# Patient Record
Sex: FEMALE | Race: WHITE | NOT HISPANIC OR LATINO
[De-identification: names, ages, dates, MRNs, and addresses within clinical notes are randomized per-mention and may not be internally consistent; named-entity substitution may affect disease eponyms.]

---

## 2018-07-28 ENCOUNTER — HOSPITAL (OUTPATIENT)
Dept: OTHER | Age: 43
End: 2018-07-28

## 2018-07-28 LAB
ANA INTERP: ABNORMAL
ANA PATTERN: ABNORMAL
ANA TITER: 80
ANTI-DSDNA: <10
ASO TITER: <100 UNITS/ML
CARDIO AB IGA: <20 APL
CARDIO AB IGG: <20 GPL
CARDIO AB IGM: <20 MPL
CERULOPLASMIN: 39.7 MG/DL (ref 22–58)
COPPER LVL: 126 MCG/DL (ref 80–155)
HEMOLYSIS 1+: NEGATIVE
JO-1 AB: <0.2 AI (ref 0–0.9)
NRNP/SM IGG: <0.2 AI (ref 0–0.9)
REF LAB RESULT: NORMAL
REF TEST NAME: NORMAL
RNP IGG AB: <0.2 AI (ref 0–0.9)
SCL-70 IGG: <0.2 AI (ref 0–0.9)
SED RATE: 8 MM/HR (ref 0–20)
SM(SMITH) IGG: <0.2 AI (ref 0–0.9)
SMOOTH MUS AB: NEGATIVE
SS-A IGG: <0.2 AI (ref 0–0.9)
SS-B IGG: <0.2 AI (ref 0–0.9)
T4 SERPL-MCNC: 5.7 UG/DL (ref 4–12)
TPO_ABS: 0.9 INT.UNITS/ML
TSH SERPL-ACNC: 1.78 MIU/ML (ref 0.4–5)
VIT B6: 54.3
VIT E (ALPHA): 16.8 MG/L (ref 6–23)
VIT E (GAMMA): 1.5 MG/L (ref 0.3–3.2)
VITAMIN B12 LVL: 852 PG/ML (ref 213–816)

## 2019-01-18 PROBLEM — F44.4 FUNCTIONAL NEUROLOGICAL SYMPTOM DISORDER WITH ABNORMAL MOVEMENT: Status: ACTIVE | Noted: 2019-01-18

## 2019-01-18 PROBLEM — F41.9 ANXIETY: Status: ACTIVE | Noted: 2019-01-18

## 2019-01-18 PROBLEM — G43.409 HEMIPLEGIC MIGRAINE WITHOUT STATUS MIGRAINOSUS, NOT INTRACTABLE: Status: ACTIVE | Noted: 2019-01-18

## 2019-01-18 NOTE — PROGRESS NOTES
[facility] [place]  PROGRESS NOTE  Chief Complaint:   Patient presents with:  Establish Care      HPI:   This is a 37year old female coming in for estalishment of care- Pt new to area and new insurance- HMO  Pt presents with her wife    Pt move status: Former Smoker      Smokeless tobacco: Never Used    Substance and Sexual Activity      Alcohol use: No        Frequency: Never      Drug use: Yes        Types: Cannabis        Comment: 1 nightly- for tremors    Family History:  History reviewed.  No Patient Position: Sitting, Cuff Size: large)   Pulse 96   Temp 97.5 °F (36.4 °C) (Tympanic)   Resp 12   Ht 64.75\"   Wt 198 lb 9.6 oz   SpO2 98%   BMI 33.30 kg/m²  Estimated body mass index is 33.3 kg/m² as calculated from the following:    Height as of th on file for this visit.     Meds & Refills for this Visit:  Requested Prescriptions      No prescriptions requested or ordered in this encounter       Health Maintenance:        Brooklynn Gonzalez MD  1/18/2019  4:01 PM    Patient/Caregiver Education: Sonny Hurley

## 2019-01-19 NOTE — PATIENT INSTRUCTIONS
rec neurology eval - referral to be made  Consider foot and ankle eval as contracture may cause long term issues    Pt became unstable with \" episode \" during visit. Sent by ambulance to [facility] for eval.  Pt more alert by time EMT arrived.   Report called to

## 2019-01-21 NOTE — TELEPHONE ENCOUNTER
I can give note  For pt to be off work, I expect they will have forms that need to be completed. OK for one refill meds  Pt to get established with neurology asap.

## 2019-01-21 NOTE — TELEPHONE ENCOUNTER
Re:  was seen on 1/18 @ 4pm - Was sent to ER . Needs refill of meds & note for work. Wants to update  On her condition.

## 2019-01-21 NOTE — TELEPHONE ENCOUNTER
Pt needs a note for work to go on short term disability. That is why pt was here on Friday before being sent to ER. When pt was in the ER, having her 3rd seizure the ER Dr states that pt was faking her seizures.     Dr informed pt if she needs to both

## 2019-01-22 ENCOUNTER — TELEPHONE (OUTPATIENT)
Dept: FAMILY MEDICINE CLINIC | Facility: CLINIC | Age: 44
End: 2019-01-22

## 2019-01-22 NOTE — TELEPHONE ENCOUNTER
Please send referral to Dr Leta Ridley at [facility]. Call her back if any questions.

## 2019-01-22 NOTE — TELEPHONE ENCOUNTER
Pt asking about neurology referral.  Pt informed that CR did provide referral to Dr. Madai Barry- contact info. given to pt.   Yoly Hendrickson stated that was fine, pt states she thought see needed to call in with name of neurologist.  Also, pt asked if she complet

## 2019-01-23 ENCOUNTER — OFFICE VISIT (OUTPATIENT)
Dept: NEUROLOGY | Facility: CLINIC | Age: 44
End: 2019-01-23

## 2019-01-23 VITALS
HEIGHT: 64.75 IN | HEART RATE: 78 BPM | DIASTOLIC BLOOD PRESSURE: 70 MMHG | RESPIRATION RATE: 16 BRPM | BODY MASS INDEX: 34.24 KG/M2 | SYSTOLIC BLOOD PRESSURE: 130 MMHG | WEIGHT: 203 LBS

## 2019-01-23 DIAGNOSIS — G43.409 HEMIPLEGIC MIGRAINE WITHOUT STATUS MIGRAINOSUS, NOT INTRACTABLE: ICD-10-CM

## 2019-01-23 DIAGNOSIS — G24.9 DYSTONIA: ICD-10-CM

## 2019-01-23 DIAGNOSIS — F44.4 FUNCTIONAL NEUROLOGICAL SYMPTOM DISORDER WITH ABNORMAL MOVEMENT: ICD-10-CM

## 2019-01-23 DIAGNOSIS — R29.90 EPISODE OF TRANSIENT NEUROLOGIC SYMPTOMS: Primary | ICD-10-CM

## 2019-01-23 PROCEDURE — 99203 OFFICE O/P NEW LOW 30 MIN: CPT | Performed by: PHYSICIAN ASSISTANT

## 2019-01-23 RX ORDER — BROMOCRIPTINE MESYLATE 2.5 MG/1
2.5 TABLET ORAL DAILY
Qty: 60 TABLET | Refills: 2 | Status: SHIPPED | OUTPATIENT
Start: 2019-01-23 | End: 2019-02-27 | Stop reason: DRUGHIGH

## 2019-01-23 NOTE — PATIENT INSTRUCTIONS
Refill policies:    • Allow 2-3 business days for refills; controlled substances may take longer.   • Contact your pharmacy at least 5 days prior to running out of medication and have them send an electronic request or submit request through the “request re entire amount billed. Precertification and Prior Authorizations: If your physician has recommended that you have a procedure or additional testing performed.   [facility]) will contact your insurance carrier to obtain pre-certi

## 2019-01-23 NOTE — PROGRESS NOTES
[facility] with [address]  4/18/1975  Primary Care Provider:  Alex Santa MD    1/23/2019  Accompanied visit:  () No (X) yes, by: Wife  Dr. Alan Rock  37year old female pa neural foraminal compromise,most noted on the left at C5-C6. Review of Systems:  Review of Systems:  Denies systemic symptoms     No SOB. No GI or  symptoms. Relevant Neuro as noted above.     Past Medical History:   Diagnosis Date   • Endometriosis exam  Patient waling on lateral aspect of the right foot. Unable to tandem gait.  Romberg positive  Unable to walk back words  Right hand tremor on exam that is distractible  Using cane  Patient with facial twitching and neck movements that seemed to wors

## 2019-01-25 ENCOUNTER — TELEPHONE (OUTPATIENT)
Dept: NEUROLOGY | Facility: CLINIC | Age: 44
End: 2019-01-25

## 2019-01-25 NOTE — TELEPHONE ENCOUNTER
Received MRI Brain w/o contrast  [facility]  DOS:  05/20/17    Reviewed by Jeanine Anthony    Sent to Scanning

## 2019-01-25 NOTE — TELEPHONE ENCOUNTER
Received MRI Cervical Spine without contrast  DOS:  06/14/18    KYLE Tavera in the [facility]  Dr. Hess Bone    Reviewed by Yamini Gonzalez to scanning

## 2019-01-28 ENCOUNTER — TELEPHONE (OUTPATIENT)
Dept: NEUROLOGY | Facility: CLINIC | Age: 44
End: 2019-01-28

## 2019-01-29 NOTE — PROGRESS NOTES
[facility] [place]  PROGRESS NOTE  Chief Complaint:   Patient presents with:  Hospital F/U      HPI:   This is a 37year old female coming in for medical follow-up. Patient seen initially on the 18th for initial visit.   During that visit she ha nightly- for tremors    Other Topics      Concerns:        Caffeine Concern: Yes          2 sodas a day        Exercise: No    Family History:  Family History   Problem Relation Age of Onset   • Glaucoma Father    • Hypertension Mother    • Glaucoma Mother Position: Sitting, Cuff Size: large)   Pulse 88   Temp 97.6 °F (36.4 °C) (Temporal)   Resp 16   Ht 64.75\"   Wt 201 lb 3.2 oz   SpO2 98%   BMI 33.74 kg/m²  Estimated body mass index is 33.74 kg/m² as calculated from the following:    Height as of this enco symptoms. Patient is to call with any side effects or complications from the treatments as a result of today.

## 2019-01-29 NOTE — PATIENT INSTRUCTIONS
Continue plan for EEG and neurology f/u    Continue stool softner 100 mg a day, laxitive as needed  Could  Increase to colace 100mg bid    Encourage hydration    Patient to continue same meds pending neurology workup

## 2019-01-31 ENCOUNTER — TELEPHONE (OUTPATIENT)
Dept: NEUROLOGY | Facility: CLINIC | Age: 44
End: 2019-01-31

## 2019-01-31 NOTE — TELEPHONE ENCOUNTER
Almita called with status of fax regarding OV medical records. At this time no fax received. Requested Almita to fax request again.

## 2019-02-01 ENCOUNTER — NURSE ONLY (OUTPATIENT)
Dept: NEUROLOGY | Facility: CLINIC | Age: 44
End: 2019-02-01

## 2019-02-01 DIAGNOSIS — G24.9 DYSTONIA: ICD-10-CM

## 2019-02-01 DIAGNOSIS — G43.409 HEMIPLEGIC MIGRAINE WITHOUT STATUS MIGRAINOSUS, NOT INTRACTABLE: Primary | ICD-10-CM

## 2019-02-01 DIAGNOSIS — R29.90 EPISODE OF TRANSIENT NEUROLOGIC SYMPTOMS: ICD-10-CM

## 2019-02-01 DIAGNOSIS — F44.4 FUNCTIONAL NEUROLOGICAL SYMPTOM DISORDER WITH ABNORMAL MOVEMENT: ICD-10-CM

## 2019-02-01 PROCEDURE — 95819 EEG AWAKE AND ASLEEP: CPT | Performed by: OTHER

## 2019-02-02 ENCOUNTER — TELEPHONE (OUTPATIENT)
Dept: FAMILY MEDICINE CLINIC | Facility: CLINIC | Age: 44
End: 2019-02-02

## 2019-02-02 NOTE — TELEPHONE ENCOUNTER
Paperwork for [employer]- Medical Request Form completed per CR. Form faxed to [employer] #[phone] and copy left at  for pt . Copy also placed into scanning.

## 2019-02-04 NOTE — PROCEDURES
[facility] in [place]  in affiliation with [facility]  [address]  [place], [address] [place] [address]  [phone]  Fax [phone]    Name: Barbie Brochure  4/18/1975  Date of S of stage II sleep seen, characterized by normal sleep spindles and occasional vertex waves and K complexes. Activation procedures:  Hyperventilation not performed. Photic driving produced driving. No activation of any paroxysmal discharges seen.

## 2019-02-05 ENCOUNTER — TELEPHONE (OUTPATIENT)
Dept: NEUROLOGY | Facility: CLINIC | Age: 44
End: 2019-02-05

## 2019-02-05 NOTE — TELEPHONE ENCOUNTER
Request received from [facility] for medical records for patient. Requested LOV notes. [facility] confirmation.

## 2019-02-07 ENCOUNTER — TELEPHONE (OUTPATIENT)
Dept: NEUROLOGY | Facility: CLINIC | Age: 44
End: 2019-02-07

## 2019-02-27 ENCOUNTER — OFFICE VISIT (OUTPATIENT)
Dept: NEUROLOGY | Facility: CLINIC | Age: 44
End: 2019-02-27

## 2019-02-27 VITALS
SYSTOLIC BLOOD PRESSURE: 123 MMHG | DIASTOLIC BLOOD PRESSURE: 82 MMHG | HEIGHT: 64.75 IN | WEIGHT: 201 LBS | RESPIRATION RATE: 16 BRPM | BODY MASS INDEX: 33.9 KG/M2 | HEART RATE: 89 BPM

## 2019-02-27 DIAGNOSIS — F41.9 ANXIETY: ICD-10-CM

## 2019-02-27 DIAGNOSIS — G24.9 DYSTONIA: Primary | ICD-10-CM

## 2019-02-27 DIAGNOSIS — F44.4 FUNCTIONAL NEUROLOGICAL SYMPTOM DISORDER WITH ABNORMAL MOVEMENT: ICD-10-CM

## 2019-02-27 DIAGNOSIS — R29.90 EPISODE OF TRANSIENT NEUROLOGIC SYMPTOMS: ICD-10-CM

## 2019-02-27 DIAGNOSIS — G43.409 HEMIPLEGIC MIGRAINE WITHOUT STATUS MIGRAINOSUS, NOT INTRACTABLE: ICD-10-CM

## 2019-02-27 PROCEDURE — 99214 OFFICE O/P EST MOD 30 MIN: CPT | Performed by: PHYSICIAN ASSISTANT

## 2019-02-27 RX ORDER — BUSPIRONE HYDROCHLORIDE 5 MG/1
TABLET ORAL
Qty: 60 TABLET | Refills: 2 | Status: SHIPPED | OUTPATIENT
Start: 2019-02-27 | End: 2019-04-29

## 2019-02-27 RX ORDER — BROMOCRIPTINE MESYLATE 5 MG/1
CAPSULE ORAL
Qty: 60 CAPSULE | Refills: 2 | Status: SHIPPED | OUTPATIENT
Start: 2019-02-27 | End: 2019-03-27 | Stop reason: DRUGHIGH

## 2019-02-27 NOTE — PROGRESS NOTES
[facility] with Liang Hernandez  4/18/1975  Primary Care Provider:  Karina Bagley MD    2/27/2019  Accompanied visit:  () No (X) yes, by: wife    37year old female patient b Oral Tab, Take 1 tablet (10 mg total) by mouth nightly., Disp: 90 tablet, Rfl: 0  •  ALPRAZolam 0.25 MG Oral Tab, Take 0.25 mg by mouth daily as needed. , Disp: , Rfl: 1  •  HYDROcodone-acetaminophen 5-325 MG Oral Tab, Take 1 tablet by mouth 3 (three) times this time      (X) Discussed potential side effects of any treatment relevant to above.     Follow up, in approximately:  (X) 6-8 weeks    () 3-4 months     () 6-8 months   () yearly   () As needed but knows to call if there are problems  () Followup for sp

## 2019-03-06 ENCOUNTER — TELEPHONE (OUTPATIENT)
Dept: FAMILY MEDICINE CLINIC | Facility: CLINIC | Age: 44
End: 2019-03-06

## 2019-03-06 ENCOUNTER — TELEPHONE (OUTPATIENT)
Dept: NEUROLOGY | Facility: CLINIC | Age: 44
End: 2019-03-06

## 2019-03-06 NOTE — TELEPHONE ENCOUNTER
Pt requesting a note that she is released to go back to work with no restrictions next Monday, March 11. Pt will pick note up once ready. Letter pended and printed for Meredith Roldan's review and signature. Letter reviewed and signed.   Placed in doreen

## 2019-03-07 ENCOUNTER — TELEPHONE (OUTPATIENT)
Dept: NEUROLOGY | Facility: CLINIC | Age: 44
End: 2019-03-07

## 2019-03-07 NOTE — TELEPHONE ENCOUNTER
Paperwork received from [facility] for disability medical request.  Called pt, Richelle Gotti states that paperwork should have been sent to neurology-  Dr. Shary Angelucci office, pt has been also seeing Melissa BANUELOS.   Paperwork faxed to #[phone] per pt

## 2019-03-07 NOTE — TELEPHONE ENCOUNTER
Pt dropped off another form to be filled out with signature to return to work. Needs to be completed and faxed by Monday, 03/11/18    Placed in nurses bin.

## 2019-03-08 NOTE — TELEPHONE ENCOUNTER
HR mgr calling to verify RTW note they received is valid. Confirmed content of 3/6/19 letter and verified it is part of the patient's chart.

## 2019-03-12 NOTE — TELEPHONE ENCOUNTER
Paperwork initiated, endorsed to [address] for review, completion and signature. Received completed, signed paperwork. Faxed as requested, confirmation rec'd. Copy to scan.

## 2019-03-14 ENCOUNTER — TELEPHONE (OUTPATIENT)
Dept: NEUROLOGY | Facility: CLINIC | Age: 44
End: 2019-03-14

## 2019-03-27 ENCOUNTER — OFFICE VISIT (OUTPATIENT)
Dept: NEUROLOGY | Facility: CLINIC | Age: 44
End: 2019-03-27

## 2019-03-27 VITALS
DIASTOLIC BLOOD PRESSURE: 82 MMHG | BODY MASS INDEX: 33 KG/M2 | RESPIRATION RATE: 16 BRPM | SYSTOLIC BLOOD PRESSURE: 134 MMHG | WEIGHT: 198 LBS | HEART RATE: 70 BPM

## 2019-03-27 DIAGNOSIS — G43.409 HEMIPLEGIC MIGRAINE WITHOUT STATUS MIGRAINOSUS, NOT INTRACTABLE: ICD-10-CM

## 2019-03-27 DIAGNOSIS — F41.9 ANXIETY: ICD-10-CM

## 2019-03-27 DIAGNOSIS — M54.16 LUMBAR RADICULOPATHY: Primary | ICD-10-CM

## 2019-03-27 DIAGNOSIS — F44.4 FUNCTIONAL NEUROLOGICAL SYMPTOM DISORDER WITH ABNORMAL MOVEMENT: ICD-10-CM

## 2019-03-27 DIAGNOSIS — G24.9 DYSTONIA: ICD-10-CM

## 2019-03-27 PROCEDURE — 99214 OFFICE O/P EST MOD 30 MIN: CPT | Performed by: PHYSICIAN ASSISTANT

## 2019-03-27 RX ORDER — BROMOCRIPTINE MESYLATE 2.5 MG/1
TABLET ORAL
Qty: 60 TABLET | Refills: 3 | Status: SHIPPED | OUTPATIENT
Start: 2019-03-27 | End: 2019-07-10

## 2019-03-27 NOTE — PROGRESS NOTES
Patient here to follow up regarding leg pain, tremors, headaches, memory loss. States that she has been more stiff, increased right foot heaviness. Memory is better since last visit overall.

## 2019-03-27 NOTE — PROGRESS NOTES
[facility] with Liang Hernandez  4/18/1975  Primary Care Provider:  Linda Taylor MD    3/27/2019  Accompanied visit:  () No (X) yes, by: signifcantother    53year old femal 198 lb   BMI 33.20 kg/m²   Looks stated age  Pink conjunctiva anicteric sclerae, moist mucosa  No LAD, neck supple  Lungs clear breath sounds  Heart S1S2, no abnormal sounds  Pink nailbeds no Cyanosis, pulses palpated    NEURO  NAD, A&Ox3  EOMI  CN II-XII documented above          Patient was seen and examined by Krystal Lindsay PA-C and Dr. Cosmo Fritz. Plan was discussed with patient and she expressed full understanding.

## 2019-04-29 DIAGNOSIS — G43.409 HEMIPLEGIC MIGRAINE WITHOUT STATUS MIGRAINOSUS, NOT INTRACTABLE: Primary | ICD-10-CM

## 2019-04-29 RX ORDER — BUSPIRONE HYDROCHLORIDE 5 MG/1
TABLET ORAL
Qty: 60 TABLET | Refills: 2 | Status: SHIPPED | OUTPATIENT
Start: 2019-04-29 | End: 2019-05-29

## 2019-04-29 NOTE — TELEPHONE ENCOUNTER
Medication: Buspirone HCL 5mg    Date of last refill: 02/27/2019 (#60/2)  [place]    Last office visit: 3/27/2019  Due back to clinic per last office note:  3-4 months  Date next office visit scheduled:  No future ap

## 2019-05-29 DIAGNOSIS — G43.409 HEMIPLEGIC MIGRAINE WITHOUT STATUS MIGRAINOSUS, NOT INTRACTABLE: ICD-10-CM

## 2019-05-29 RX ORDER — BUSPIRONE HYDROCHLORIDE 5 MG/1
TABLET ORAL
Qty: 60 TABLET | Refills: 3 | Status: SHIPPED | OUTPATIENT
Start: 2019-05-29 | End: 2019-07-10

## 2019-05-29 RX ORDER — BROMOCRIPTINE MESYLATE 5 MG/1
CAPSULE ORAL
Qty: 60 CAPSULE | Refills: 3 | Status: SHIPPED | OUTPATIENT
Start: 2019-05-29 | End: 2019-07-08

## 2019-05-29 NOTE — TELEPHONE ENCOUNTER
Medication: Buspirone 5 mg tab PO BID    Date of last refill: 04/29/2019 (#60/2)  [place]    Last office visit: 3/27/2019  Due back to clinic per last office note: 3-4 months  Date next office visit scheduled:  No fu

## 2019-05-29 NOTE — TELEPHONE ENCOUNTER
Medication: Bromocriptine  2.5mg     Date of last refill: 03/27/19 (#60/3)  [place]     Last office visit: 3/27/2019  Due back to clinic per last office note:  3-4 months  Date next office visit scheduled:  No future

## 2019-07-03 ENCOUNTER — TELEPHONE (OUTPATIENT)
Dept: FAMILY MEDICINE CLINIC | Facility: CLINIC | Age: 44
End: 2019-07-03

## 2019-07-03 RX ORDER — AMITRIPTYLINE HYDROCHLORIDE 10 MG/1
10 TABLET, FILM COATED ORAL NIGHTLY
Qty: 90 TABLET | Refills: 0 | Status: CANCELLED | OUTPATIENT
Start: 2019-07-03

## 2019-07-03 NOTE — TELEPHONE ENCOUNTER
Future appt:    Last Appointment:  1/29/2019    Pt asking for med refill  Amitriptyline refilled on 1/21/19 for #90 -  Pt states she did not have insurance, but now is covered as of July 1st.  Pt is out of medication, looking for refill            No resul

## 2019-07-03 NOTE — TELEPHONE ENCOUNTER
Patient advised to make appointment to re-establish care. Last amytriptyline rx filled in January.  Does not appear pt taking on regular basis

## 2019-07-03 NOTE — TELEPHONE ENCOUNTER
Requesting refill for Buspirone and Amitriptyline sent to [facility]

## 2019-07-05 DIAGNOSIS — M54.16 LUMBAR RADICULOPATHY: Primary | ICD-10-CM

## 2019-07-05 RX ORDER — BROMOCRIPTINE MESYLATE 5 MG/1
CAPSULE ORAL
Qty: 60 CAPSULE | Refills: 3 | OUTPATIENT
Start: 2019-07-05

## 2019-07-05 NOTE — TELEPHONE ENCOUNTER
Medication: Bromocriptine 5 mg     Date of last refill: 05/29/19 with 3 addt refills to [facility]  [place]     Last office visit: 3/27/2019  Due back to clinic per last office note: 3-4 months  Date next office visit

## 2019-07-08 RX ORDER — BROMOCRIPTINE MESYLATE 5 MG/1
CAPSULE ORAL
Qty: 60 CAPSULE | Refills: 3 | Status: SHIPPED | OUTPATIENT
Start: 2019-07-08 | End: 2019-11-03

## 2019-10-28 ENCOUNTER — TELEPHONE (OUTPATIENT)
Dept: NEUROLOGY | Facility: CLINIC | Age: 44
End: 2019-10-28

## 2019-10-28 NOTE — TELEPHONE ENCOUNTER
Pt newly diagnosed with tremors and feels she is getting worse. Tremors are now in her neck. Declined appt with Meredith, took first available with Dr. Janet Long - 11/27. Can something be done before appt?

## 2019-11-03 DIAGNOSIS — M54.16 LUMBAR RADICULOPATHY: ICD-10-CM

## 2019-11-04 RX ORDER — BROMOCRIPTINE MESYLATE 5 MG/1
CAPSULE ORAL
Qty: 60 CAPSULE | Refills: 1 | Status: SHIPPED | OUTPATIENT
Start: 2019-11-04 | End: 2019-11-13

## 2019-11-13 ENCOUNTER — TELEPHONE (OUTPATIENT)
Dept: NEUROLOGY | Facility: CLINIC | Age: 44
End: 2019-11-13

## 2019-11-13 DIAGNOSIS — M54.16 LUMBAR RADICULOPATHY: ICD-10-CM

## 2019-11-13 RX ORDER — BROMOCRIPTINE MESYLATE 5 MG/1
CAPSULE ORAL
Qty: 60 CAPSULE | Refills: 1 | COMMUNITY
Start: 2019-11-13 | End: 2020-01-14

## 2019-11-13 NOTE — TELEPHONE ENCOUNTER
RN confirmed with partner Aurora Ponce for [facility] Figueroa that she will need more of the Bromocriptine. RN will call in new script. RN encouraged partner to try to relax and the script was going to call in the script.       Aurora Ponce verbalized understanding an

## 2019-11-13 NOTE — TELEPHONE ENCOUNTER
Current Outpatient Medications:   •  Bromocriptine Mesylate 5 MG Oral Cap, TAKE 1 CAPSULE BY MOUTH TWICE A DAY, Disp: 60 capsule, Rfl: 1  •  busPIRone HCl 5 MG Oral Tab, TAKE 1 TABLET BY MOUTH TWICE A DAY, Disp: 180 tablet, Rfl: 0  •  hydrocodone-homatro

## 2019-11-13 NOTE — TELEPHONE ENCOUNTER
RN call In script Bromocriptine 5mg TID. Spoke to May the pharmacist and phoned in the order. Pharmacist confirmed the order.

## 2019-11-13 NOTE — TELEPHONE ENCOUNTER
S: Spouse- Manuel Bond called for a pt-Shyanne in a panic as the pt was texting her the following symptoms.      B: Dx: Dystonia, Tremors, Anxiety      A: Pt Can barely speak this morning, falling often, and her hands are shaking terribly that she can not ti

## 2019-11-27 ENCOUNTER — TELEPHONE (OUTPATIENT)
Dept: NEUROLOGY | Facility: CLINIC | Age: 44
End: 2019-11-27

## 2019-11-27 ENCOUNTER — OFFICE VISIT (OUTPATIENT)
Dept: NEUROLOGY | Facility: CLINIC | Age: 44
End: 2019-11-27
Payer: COMMERCIAL

## 2019-11-27 VITALS
RESPIRATION RATE: 16 BRPM | SYSTOLIC BLOOD PRESSURE: 132 MMHG | HEART RATE: 74 BPM | BODY MASS INDEX: 31.2 KG/M2 | WEIGHT: 185 LBS | DIASTOLIC BLOOD PRESSURE: 72 MMHG | HEIGHT: 64.5 IN

## 2019-11-27 DIAGNOSIS — F44.4 FUNCTIONAL NEUROLOGICAL SYMPTOM DISORDER WITH ABNORMAL MOVEMENT: ICD-10-CM

## 2019-11-27 DIAGNOSIS — G24.9 DYSTONIA: Primary | ICD-10-CM

## 2019-11-27 DIAGNOSIS — M62.838 MUSCLE SPASM: ICD-10-CM

## 2019-11-27 PROCEDURE — 99214 OFFICE O/P EST MOD 30 MIN: CPT | Performed by: OTHER

## 2019-11-27 RX ORDER — BACLOFEN 10 MG/1
10 TABLET ORAL NIGHTLY
Qty: 30 TABLET | Refills: 2 | Status: SHIPPED | OUTPATIENT
Start: 2019-11-27 | End: 2019-12-13

## 2019-11-27 NOTE — TELEPHONE ENCOUNTER
Pt called to speak with  in regards to her appt with Dr. Nima Rooney on 11/27/19. Advised pt manager will return on 11/29/19 and will return call on that day. Pt agreed and will await call on 11/29/19.

## 2019-11-27 NOTE — PROGRESS NOTES
[facility] Progress Note    HPI  Kiera Stark is a 53year old female with PMHx significant for tremors, dystonia, anxiety, who is normally followed by my neurology associate Dr. Fely Marshall.      Patient presents in follow up; she name: Not on file      Number of children: Not on file      Years of education: Not on file      Highest education level: Not on file    Occupational History      Occupation:  and Systems    Tobacco Use      Smoking statu bilaterally  Extremities: no edema, peripheral pulses intact    Neck: flexed forward with tenderness to cervical paraspinal muscles bilaterally but able to rotate side to side though with pain    Neuro:  Mental status:  Orientation: Alert and oriented to p neurologist, Dr. 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

## 2019-11-29 NOTE — TELEPHONE ENCOUNTER
Pt was not happy with her visit with Dr. Gaurang Tee on 11/27/19. Per pt, Dr. Gaurang Tee did not shake her hand or introduce herself, generally unhappy with OV. I apologized for the bad experience and schedule OV for Tuesday 12/3/19 with Dr. Kavita Galvan.

## 2019-12-13 ENCOUNTER — OFFICE VISIT (OUTPATIENT)
Dept: NEUROLOGY | Facility: CLINIC | Age: 44
End: 2019-12-13
Payer: COMMERCIAL

## 2019-12-13 VITALS
WEIGHT: 185 LBS | DIASTOLIC BLOOD PRESSURE: 78 MMHG | BODY MASS INDEX: 31.2 KG/M2 | RESPIRATION RATE: 16 BRPM | HEART RATE: 74 BPM | SYSTOLIC BLOOD PRESSURE: 122 MMHG | HEIGHT: 64.5 IN

## 2019-12-13 DIAGNOSIS — G24.9 DYSTONIA: Primary | ICD-10-CM

## 2019-12-13 DIAGNOSIS — M62.838 MUSCLE SPASM: ICD-10-CM

## 2019-12-13 DIAGNOSIS — R29.90 EPISODE OF TRANSIENT NEUROLOGIC SYMPTOMS: ICD-10-CM

## 2019-12-13 DIAGNOSIS — G43.409 HEMIPLEGIC MIGRAINE WITHOUT STATUS MIGRAINOSUS, NOT INTRACTABLE: ICD-10-CM

## 2019-12-13 PROCEDURE — 99214 OFFICE O/P EST MOD 30 MIN: CPT | Performed by: OTHER

## 2019-12-13 RX ORDER — BACLOFEN 10 MG/1
10 TABLET ORAL NIGHTLY
Qty: 30 TABLET | Refills: 2 | Status: SHIPPED | OUTPATIENT
Start: 2019-12-13 | End: 2020-05-27

## 2019-12-13 RX ORDER — SUMATRIPTAN 100 MG/1
TABLET, FILM COATED ORAL
Qty: 9 TABLET | Refills: 3 | Status: SHIPPED | OUTPATIENT
Start: 2019-12-13

## 2019-12-13 NOTE — PROGRESS NOTES
[facility] with Liang Hernandez  4/18/1975  Primary Care Provider:  Yan Johnson MD    12/13/2019  Accompanied visit: x    ( ) No.        53year old yo patient being seen for: Cyanosis, pulses palpated    NEURO  Neuro: Alert oriented with normal CN 2-12. No motor and sensory deficits. DTRs were symmetric and no upper motor neuron signs. Coordination was normal.      RELEVANT LABS and other DATA reviewed.        Problem/s Identifi including consultations  (  ) [facility] meetings - patient not present --non F2F  Non Face to Face CPT code 77810/94087 applies as documented above    PROCEDURE DONE     (   ) see notes      After visit, patient was escorted out and handed-off discharge process

## 2020-01-02 PROBLEM — G43.909 MIGRAINES: Status: ACTIVE | Noted: 2020-01-02

## 2020-01-14 DIAGNOSIS — M54.16 LUMBAR RADICULOPATHY: ICD-10-CM

## 2020-01-14 RX ORDER — BROMOCRIPTINE MESYLATE 5 MG/1
CAPSULE ORAL
Qty: 60 CAPSULE | Refills: 5 | Status: SHIPPED | OUTPATIENT
Start: 2020-01-14 | End: 2020-06-15

## 2020-01-14 NOTE — TELEPHONE ENCOUNTER
Medication: Bromocriptine 5 mg     Date of last refill:11/13/2019 with 1 addt refill        Last office visit: 11/27/2019  Due back to clinic per last office note: 3-4 months  Date next office visit scheduled:  03/12/2020     Last OV note recommendation: P

## 2020-02-03 NOTE — TELEPHONE ENCOUNTER
----- Message from Ernesto Hernández sent at 2/3/2020  8:51 AM CST -----  Contact: Loli Fragoso  Wife is calling she says she needs nurse matt to give her a call back please in regards to her .   Wife# [phone]   Send to [place]

## 2020-03-12 ENCOUNTER — TELEPHONE (OUTPATIENT)
Dept: NEUROLOGY | Facility: CLINIC | Age: 45
End: 2020-03-12

## 2020-03-12 ENCOUNTER — OFFICE VISIT (OUTPATIENT)
Dept: NEUROLOGY | Facility: CLINIC | Age: 45
End: 2020-03-12
Payer: COMMERCIAL

## 2020-03-12 VITALS
SYSTOLIC BLOOD PRESSURE: 130 MMHG | HEART RATE: 74 BPM | BODY MASS INDEX: 31.2 KG/M2 | WEIGHT: 185 LBS | RESPIRATION RATE: 16 BRPM | HEIGHT: 64.5 IN | DIASTOLIC BLOOD PRESSURE: 70 MMHG

## 2020-03-12 DIAGNOSIS — G43.401 HEMIPLEGIC MIGRAINE, NOT INTRACTABLE, WITH STATUS MIGRAINOSUS: Primary | ICD-10-CM

## 2020-03-12 DIAGNOSIS — G24.9 DYSTONIA: Primary | ICD-10-CM

## 2020-03-12 DIAGNOSIS — G43.409 HEMIPLEGIC MIGRAINE WITHOUT STATUS MIGRAINOSUS, NOT INTRACTABLE: ICD-10-CM

## 2020-03-12 PROCEDURE — 99214 OFFICE O/P EST MOD 30 MIN: CPT | Performed by: OTHER

## 2020-03-12 RX ORDER — AMITRIPTYLINE HYDROCHLORIDE 10 MG/1
10 TABLET, FILM COATED ORAL NIGHTLY
Qty: 30 TABLET | Refills: 1 | Status: SHIPPED | OUTPATIENT
Start: 2020-03-12 | End: 2021-04-15

## 2020-03-12 NOTE — PROGRESS NOTES
[facility] with Liang Hernandez  4/18/1975  Primary Care Provider:  Sheron Nesbitt MD    3/12/2020  Accompanied visit:  Family     () No.        53year old yo patient being seen sounds  Heart S1S2, no abnormal sounds  Pink nailbeds no Cyanosis, pulses palpated    NEURO  Spastic gait    RELEVANT LABS and other DATA reviewed.          Problem/s Identified this visit:   Dystonia  (primary encounter diagnosis)  Hemiplegic migraine with Neurology  Director, Multiple Sclerosis Program  [facility]  3/12/2020, Time completed 11:41 AM    Decision making:  ( x ) labs reviewed/ordered - 1  (  ) new diagnosis: - 1  ( x) Images & studies independently reviewed -non F2F  (  ) C

## 2020-03-12 NOTE — PROGRESS NOTES
RN meet with pt and educated pt and wife (at pt's side) about Aimovig 140mg/ml. RN assisted pt in administering her medication. RN explained that we will process her PA and once it is approved will send it to her pharmacy.   Pt and pt's wife verbalized un

## 2020-04-06 RX ORDER — AMITRIPTYLINE HYDROCHLORIDE 10 MG/1
TABLET, FILM COATED ORAL
Qty: 30 TABLET | Refills: 1 | OUTPATIENT
Start: 2020-04-06

## 2020-05-12 DIAGNOSIS — G43.401 HEMIPLEGIC MIGRAINE, NOT INTRACTABLE, WITH STATUS MIGRAINOSUS: Primary | ICD-10-CM

## 2020-05-13 RX ORDER — ERENUMAB-AOOE 140 MG/ML
INJECTION, SOLUTION SUBCUTANEOUS
Qty: 1 PEN | Refills: 5 | OUTPATIENT
Start: 2020-05-13

## 2020-05-18 ENCOUNTER — PATIENT MESSAGE (OUTPATIENT)
Dept: NEUROLOGY | Facility: CLINIC | Age: 45
End: 2020-05-18

## 2020-05-18 DIAGNOSIS — G43.409 HEMIPLEGIC MIGRAINE WITHOUT STATUS MIGRAINOSUS, NOT INTRACTABLE: ICD-10-CM

## 2020-05-18 RX ORDER — ERENUMAB-AOOE 140 MG/ML
INJECTION, SOLUTION SUBCUTANEOUS
Qty: 1 PEN | Refills: 5 | OUTPATIENT
Start: 2020-05-18

## 2020-05-18 NOTE — TELEPHONE ENCOUNTER
From: Geraldine Marroquin  To:  Shahla Curtis MD  Sent: 5/18/2020 9:59 AM CDT  Subject: Prescription William Cam,     My pharmacy accidentally deleted my prescription for Archie Raeann when I actually asked them to delete the amitriptylin

## 2020-05-18 NOTE — TELEPHONE ENCOUNTER
Medication: Erenumab-aooe (AIMOVIG) 140 MG/ML SC SOAJ     Date of last refill:3/12/2020 (#1/5)  [place]    Last office visit: 3/12/2020  Due back to clinic per last office note:    Date next office visit scheduled:

## 2020-05-20 NOTE — TELEPHONE ENCOUNTER
Pharmacy requesting refill for Barak Wan again for patient. Refilled on 5/18/2020 for 6 month supply. Called and spoke with pharmacy staff to clarify, give verbal order for medication.

## 2020-05-27 DIAGNOSIS — M62.838 MUSCLE SPASM: ICD-10-CM

## 2020-05-27 DIAGNOSIS — G24.9 DYSTONIA: ICD-10-CM

## 2020-05-27 RX ORDER — BACLOFEN 10 MG/1
TABLET ORAL
Qty: 30 TABLET | Refills: 2 | Status: SHIPPED | OUTPATIENT
Start: 2020-05-27 | End: 2020-08-20

## 2020-05-27 NOTE — TELEPHONE ENCOUNTER
Medication: baclofen 10 MG Oral Tab    Date of last refill: 12/13/2019 (#30/2)  [place]    Last office visit: 3/12/2020  Due back to clinic per last office note:  3 months  Date next office visit scheduled:    Future

## 2020-06-13 DIAGNOSIS — M54.16 LUMBAR RADICULOPATHY: ICD-10-CM

## 2020-06-15 RX ORDER — BROMOCRIPTINE MESYLATE 5 MG/1
CAPSULE ORAL
Qty: 90 CAPSULE | Refills: 3 | Status: SHIPPED | OUTPATIENT
Start: 2020-06-15 | End: 2020-10-19

## 2020-06-15 NOTE — TELEPHONE ENCOUNTER
Medication: Bromocriptine Mesylate 5 MG Oral Cap    Date of last refill: 1/14/2020(#60/5)  [place]    Last office visit: 3/12/2020  Due back to clinic per last office note:    Date next office visit scheduled:  No fu

## 2020-08-20 DIAGNOSIS — G24.9 DYSTONIA: ICD-10-CM

## 2020-08-20 DIAGNOSIS — M62.838 MUSCLE SPASM: ICD-10-CM

## 2020-08-20 RX ORDER — BACLOFEN 10 MG/1
TABLET ORAL
Qty: 90 TABLET | Refills: 2 | Status: SHIPPED | OUTPATIENT
Start: 2020-08-20 | End: 2021-05-13

## 2020-08-20 NOTE — TELEPHONE ENCOUNTER
Medication: Baclofen 10 mg & Carbidopa-Levodopa  mg     Date of last refill: 05/2720 with 2 addt refills & 03/12/20  [place]     Last office visit: 3/12/2020  Due back to clinic per last office note:  RTN in 3

## 2020-10-17 DIAGNOSIS — M54.16 LUMBAR RADICULOPATHY: ICD-10-CM

## 2020-10-19 RX ORDER — BROMOCRIPTINE MESYLATE 5 MG/1
CAPSULE ORAL
Qty: 90 CAPSULE | Refills: 5 | Status: SHIPPED | OUTPATIENT
Start: 2020-10-19 | End: 2021-01-13

## 2020-10-19 NOTE — TELEPHONE ENCOUNTER
Medication: Bromocriptan 5 mg     Date of last refill: 06/15/20 with 3 addt refills  [place]     Last office visit: 3/12/2020  Due back to clinic per last office note:  RTN in 3 months  Date next office visit schedul

## 2020-11-22 DIAGNOSIS — G43.409 HEMIPLEGIC MIGRAINE WITHOUT STATUS MIGRAINOSUS, NOT INTRACTABLE: ICD-10-CM

## 2020-11-23 ENCOUNTER — TELEPHONE (OUTPATIENT)
Dept: NEUROLOGY | Facility: CLINIC | Age: 45
End: 2020-11-23

## 2020-11-23 ENCOUNTER — TELEPHONE (OUTPATIENT)
Dept: PAIN MANAGEMENT | Age: 45
End: 2020-11-23

## 2020-11-23 RX ORDER — ERENUMAB-AOOE 140 MG/ML
INJECTION, SOLUTION SUBCUTANEOUS
Qty: 1 PEN | Refills: 5 | Status: SHIPPED | OUTPATIENT
Start: 2020-11-23

## 2020-11-23 NOTE — TELEPHONE ENCOUNTER
Pt has questions about a newly prescribed medication interacting with her current medications. Call pt to discuss.

## 2020-11-23 NOTE — TELEPHONE ENCOUNTER
Medication: Aimovig 140 mg     Date of last refill: 05/18/20  [place]     Last office visit: 3/12/2020  Due back to clinic per last office note:  RTN in 3 months  Date next office visit scheduled:  No future appointm

## 2020-11-23 NOTE — TELEPHONE ENCOUNTER
Spoke with patient wean off Bromocriptine and just stay on CDLD.   Keep follow up appointment  Dr Vasyl Hunter

## 2020-11-23 NOTE — TELEPHONE ENCOUNTER
Pt was newly diagnosed with Schizophrenia. Prescribed Zyprexa 10mg Daily. Concerned about interactions with her other medications. Please advise.

## 2020-12-08 ENCOUNTER — OFFICE VISIT (OUTPATIENT)
Dept: PAIN MANAGEMENT | Age: 45
End: 2020-12-08

## 2020-12-08 VITALS — DIASTOLIC BLOOD PRESSURE: 68 MMHG | SYSTOLIC BLOOD PRESSURE: 124 MMHG | TEMPERATURE: 97.4 F | RESPIRATION RATE: 16 BRPM

## 2020-12-08 DIAGNOSIS — G43.409 HEMIPLEGIC MIGRAINE WITHOUT STATUS MIGRAINOSUS, NOT INTRACTABLE: ICD-10-CM

## 2020-12-08 DIAGNOSIS — G43.001 MIGRAINE WITHOUT AURA AND WITH STATUS MIGRAINOSUS, NOT INTRACTABLE: ICD-10-CM

## 2020-12-08 DIAGNOSIS — M79.2 ATYPICAL NEURALGIA: ICD-10-CM

## 2020-12-08 DIAGNOSIS — M54.16 CHRONIC LUMBAR RADICULOPATHY: ICD-10-CM

## 2020-12-08 DIAGNOSIS — S24.109S SPINAL CORD INJURY OF DORSAL REGION WITHOUT BONE INJURY, SEQUELA (CMD): ICD-10-CM

## 2020-12-08 DIAGNOSIS — F44.4 FUNCTIONAL NEUROLOGICAL SYMPTOM DISORDER WITH ABNORMAL MOVEMENT: Primary | ICD-10-CM

## 2020-12-08 DIAGNOSIS — M79.2 SYMPATHETICALLY MAINTAINED PAIN: ICD-10-CM

## 2020-12-08 PROBLEM — G43.909 MIGRAINES: Status: ACTIVE | Noted: 2020-01-02

## 2020-12-08 PROCEDURE — 99205 OFFICE O/P NEW HI 60 MIN: CPT | Performed by: ANESTHESIOLOGY

## 2020-12-08 RX ORDER — NICOTINE POLACRILEX 4 MG/1
20 GUM, CHEWING ORAL
COMMUNITY

## 2020-12-08 RX ORDER — BACLOFEN 10 MG/1
TABLET ORAL
COMMUNITY
Start: 2020-08-20

## 2020-12-08 RX ORDER — DULOXETIN HYDROCHLORIDE 60 MG/1
120 CAPSULE, DELAYED RELEASE ORAL
COMMUNITY
Start: 2020-11-05

## 2020-12-08 RX ORDER — SUMATRIPTAN 100 MG/1
TABLET, FILM COATED ORAL
COMMUNITY
Start: 2019-12-13

## 2020-12-08 RX ORDER — GABAPENTIN 100 MG/1
100 CAPSULE ORAL 3 TIMES DAILY
Qty: 90 CAPSULE | Refills: 0 | Status: SHIPPED | OUTPATIENT
Start: 2020-12-08 | End: 2021-01-07 | Stop reason: SDUPTHER

## 2020-12-08 RX ORDER — ALBUTEROL SULFATE 90 UG/1
AEROSOL, METERED RESPIRATORY (INHALATION)
COMMUNITY
Start: 2020-08-22

## 2020-12-08 RX ORDER — HYDROCODONE BITARTRATE AND ACETAMINOPHEN 5; 325 MG/1; MG/1
1 TABLET ORAL 2 TIMES DAILY
Qty: 60 TABLET | Refills: 0 | Status: SHIPPED | OUTPATIENT
Start: 2020-12-08 | End: 2021-01-07 | Stop reason: SDUPTHER

## 2020-12-08 RX ORDER — OLANZAPINE 10 MG/1
10 TABLET ORAL EVERY EVENING
COMMUNITY
Start: 2020-11-18

## 2020-12-08 RX ORDER — BUSPIRONE HYDROCHLORIDE 5 MG/1
TABLET ORAL
COMMUNITY
Start: 2020-11-30

## 2020-12-08 SDOH — HEALTH STABILITY: MENTAL HEALTH: HOW OFTEN DO YOU HAVE A DRINK CONTAINING ALCOHOL?: NEVER

## 2020-12-08 ASSESSMENT — ENCOUNTER SYMPTOMS
HEMATOLOGIC/LYMPHATIC NEGATIVE: 1
NUMBNESS: 1
WEAKNESS: 1
RESPIRATORY NEGATIVE: 1
EYES NEGATIVE: 1
GASTROINTESTINAL NEGATIVE: 1
PSYCHIATRIC NEGATIVE: 1
ALLERGIC/IMMUNOLOGIC NEGATIVE: 1
CONSTITUTIONAL NEGATIVE: 1
ENDOCRINE NEGATIVE: 1

## 2020-12-22 ENCOUNTER — TELEPHONE (OUTPATIENT)
Dept: PAIN MANAGEMENT | Age: 45
End: 2020-12-22

## 2021-01-07 ENCOUNTER — OFFICE VISIT (OUTPATIENT)
Dept: PAIN MANAGEMENT | Age: 46
End: 2021-01-07

## 2021-01-07 VITALS — SYSTOLIC BLOOD PRESSURE: 122 MMHG | DIASTOLIC BLOOD PRESSURE: 80 MMHG | TEMPERATURE: 97.5 F

## 2021-01-07 DIAGNOSIS — G43.409 HEMIPLEGIC MIGRAINE WITHOUT STATUS MIGRAINOSUS, NOT INTRACTABLE: ICD-10-CM

## 2021-01-07 DIAGNOSIS — S24.109S SPINAL CORD INJURY OF DORSAL REGION WITHOUT BONE INJURY, SEQUELA (CMD): ICD-10-CM

## 2021-01-07 DIAGNOSIS — M54.16 CHRONIC LUMBAR RADICULOPATHY: ICD-10-CM

## 2021-01-07 DIAGNOSIS — M79.2 ATYPICAL NEURALGIA: Primary | ICD-10-CM

## 2021-01-07 DIAGNOSIS — M79.2 SYMPATHETICALLY MAINTAINED PAIN: ICD-10-CM

## 2021-01-07 DIAGNOSIS — G43.109 MIGRAINE WITH AURA AND WITHOUT STATUS MIGRAINOSUS, NOT INTRACTABLE: ICD-10-CM

## 2021-01-07 PROBLEM — J45.20 MILD INTERMITTENT ASTHMA WITHOUT COMPLICATION: Status: ACTIVE | Noted: 2021-01-07

## 2021-01-07 PROBLEM — F12.20 MARIJUANA DEPENDENCE (HCC): Status: ACTIVE | Noted: 2021-01-07

## 2021-01-07 PROCEDURE — 99214 OFFICE O/P EST MOD 30 MIN: CPT | Performed by: ANESTHESIOLOGY

## 2021-01-07 RX ORDER — HYDROCODONE BITARTRATE AND ACETAMINOPHEN 5; 325 MG/1; MG/1
1 TABLET ORAL DAILY PRN
Qty: 30 TABLET | Refills: 0 | Status: SHIPPED | OUTPATIENT
Start: 2021-02-07 | End: 2021-12-09 | Stop reason: SDUPTHER

## 2021-01-07 RX ORDER — GABAPENTIN 100 MG/1
100 CAPSULE ORAL 3 TIMES DAILY
Qty: 90 CAPSULE | Refills: 1 | Status: SHIPPED | OUTPATIENT
Start: 2021-01-07 | End: 2021-03-11

## 2021-01-07 SDOH — HEALTH STABILITY: MENTAL HEALTH: HOW OFTEN DO YOU HAVE A DRINK CONTAINING ALCOHOL?: NEVER

## 2021-01-07 ASSESSMENT — PATIENT HEALTH QUESTIONNAIRE - PHQ9
2. FEELING DOWN, DEPRESSED OR HOPELESS: NOT AT ALL
SUM OF ALL RESPONSES TO PHQ9 QUESTIONS 1 AND 2: 0
CLINICAL INTERPRETATION OF PHQ9 SCORE: NO FURTHER SCREENING NEEDED
CLINICAL INTERPRETATION OF PHQ2 SCORE: NO FURTHER SCREENING NEEDED
1. LITTLE INTEREST OR PLEASURE IN DOING THINGS: NOT AT ALL
SUM OF ALL RESPONSES TO PHQ9 QUESTIONS 1 AND 2: 0

## 2021-01-07 ASSESSMENT — ENCOUNTER SYMPTOMS
PSYCHIATRIC NEGATIVE: 1
ENDOCRINE NEGATIVE: 1
HEMATOLOGIC/LYMPHATIC NEGATIVE: 1
EYES NEGATIVE: 1
RESPIRATORY NEGATIVE: 1
CONSTITUTIONAL NEGATIVE: 1
BACK PAIN: 1
NEUROLOGICAL NEGATIVE: 1
GASTROINTESTINAL NEGATIVE: 1
ALLERGIC/IMMUNOLOGIC NEGATIVE: 1

## 2021-01-13 ENCOUNTER — OFFICE VISIT (OUTPATIENT)
Dept: NEUROLOGY | Facility: CLINIC | Age: 46
End: 2021-01-13
Payer: COMMERCIAL

## 2021-01-13 VITALS
WEIGHT: 170 LBS | BODY MASS INDEX: 29.02 KG/M2 | SYSTOLIC BLOOD PRESSURE: 122 MMHG | RESPIRATION RATE: 16 BRPM | DIASTOLIC BLOOD PRESSURE: 70 MMHG | HEART RATE: 72 BPM | HEIGHT: 64 IN

## 2021-01-13 DIAGNOSIS — F44.4 FUNCTIONAL NEUROLOGICAL SYMPTOM DISORDER WITH ABNORMAL MOVEMENT: ICD-10-CM

## 2021-01-13 DIAGNOSIS — R41.89 COGNITIVE DEFICITS: ICD-10-CM

## 2021-01-13 DIAGNOSIS — G24.9 DYSTONIA: ICD-10-CM

## 2021-01-13 DIAGNOSIS — G43.409 HEMIPLEGIC MIGRAINE WITHOUT STATUS MIGRAINOSUS, NOT INTRACTABLE: Primary | ICD-10-CM

## 2021-01-13 PROCEDURE — 3078F DIAST BP <80 MM HG: CPT | Performed by: OTHER

## 2021-01-13 PROCEDURE — 3008F BODY MASS INDEX DOCD: CPT | Performed by: OTHER

## 2021-01-13 PROCEDURE — 99214 OFFICE O/P EST MOD 30 MIN: CPT | Performed by: OTHER

## 2021-01-13 PROCEDURE — 3074F SYST BP LT 130 MM HG: CPT | Performed by: OTHER

## 2021-01-13 RX ORDER — OLANZAPINE 2.5 MG/1
2.5 TABLET ORAL NIGHTLY
COMMUNITY

## 2021-01-13 NOTE — PROGRESS NOTES
[facility] with Liang Hernandez  4/18/1975  Primary Care Provider:  Duane Peña MD    1/13/2021  Accompanied visit:      (x) No.      50year old yo patient being seen for:  papito DAY AT NIGHT, Disp: 90 tablet, Rfl: 2  •  Amitriptyline HCl 10 MG Oral Tab, Take 1 tablet (10 mg total) by mouth nightly., Disp: 30 tablet, Rfl: 1  •  SUMAtriptan Succinate 100 MG Oral Tab, One tab at onset of migraine, to repeat 2 hours later if needed, D reviewed -non F2F  (  ) Case/studies discussed with other caregivers - -non F2F  (  ) Telephone time with patiern or authorized [facility] member--non F2F  ( x ) other records reviewed --non F2F including consultations  (  ) [facility] meetings - patient not present --n

## 2021-03-01 ENCOUNTER — TELEPHONE (OUTPATIENT)
Dept: PAIN MANAGEMENT | Age: 46
End: 2021-03-01

## 2021-03-11 RX ORDER — GABAPENTIN 100 MG/1
CAPSULE ORAL
Qty: 90 CAPSULE | Refills: 1 | Status: SHIPPED | OUTPATIENT
Start: 2021-03-11 | End: 2021-04-29 | Stop reason: SDUPTHER

## 2021-03-12 ENCOUNTER — TELEPHONE (OUTPATIENT)
Dept: NEUROLOGY | Facility: CLINIC | Age: 46
End: 2021-03-12

## 2021-03-12 NOTE — TELEPHONE ENCOUNTER
Clinton Ramirez  Neuropsychological evaluation received  DOS:  01/21/21, 02/25/21  Placed copy in Dr. Yary Rushing bin  Copy to scanning

## 2021-03-15 NOTE — TELEPHONE ENCOUNTER
Noted conclusion of mild cognitive impairment secondary to emotional state referring to major depressive disorder mix and qualities of anxiety and racing thoughts.     Shaan Valenzuela MD  Vascular & General Neurology  Director, Multiple Sclerosis Program  E

## 2021-03-18 ENCOUNTER — APPOINTMENT (OUTPATIENT)
Dept: PAIN MANAGEMENT | Age: 46
End: 2021-03-18

## 2021-03-29 ENCOUNTER — TELEPHONE (OUTPATIENT)
Dept: NEUROLOGY | Facility: CLINIC | Age: 46
End: 2021-03-29

## 2021-03-29 NOTE — TELEPHONE ENCOUNTER
RN spoke to the provider and he advised to have the patient fax over the [facility] paperwork for us to complete. RN spoke to the patient and she verbalized understanding and is going to send it over.

## 2021-03-29 NOTE — TELEPHONE ENCOUNTER
Patient calling stating she would like advice from Dr. Zahraa Jones. She advises that she is having a lot of memory issues and increased anxiety. She is having trouble at work as a result and feels she may need to apply for FMLA.      Tentative appointment ally

## 2021-04-15 ENCOUNTER — OFFICE VISIT (OUTPATIENT)
Dept: NEUROLOGY | Facility: CLINIC | Age: 46
End: 2021-04-15
Payer: COMMERCIAL

## 2021-04-15 VITALS
HEIGHT: 64 IN | HEART RATE: 74 BPM | RESPIRATION RATE: 16 BRPM | SYSTOLIC BLOOD PRESSURE: 120 MMHG | DIASTOLIC BLOOD PRESSURE: 74 MMHG | BODY MASS INDEX: 29.02 KG/M2 | WEIGHT: 170 LBS

## 2021-04-15 DIAGNOSIS — G43.409 HEMIPLEGIC MIGRAINE WITHOUT STATUS MIGRAINOSUS, NOT INTRACTABLE: Primary | ICD-10-CM

## 2021-04-15 DIAGNOSIS — F44.4 FUNCTIONAL NEUROLOGICAL SYMPTOM DISORDER WITH ABNORMAL MOVEMENT: ICD-10-CM

## 2021-04-15 DIAGNOSIS — G24.9 DYSTONIA: ICD-10-CM

## 2021-04-15 PROCEDURE — 3078F DIAST BP <80 MM HG: CPT | Performed by: OTHER

## 2021-04-15 PROCEDURE — 3074F SYST BP LT 130 MM HG: CPT | Performed by: OTHER

## 2021-04-15 PROCEDURE — 99214 OFFICE O/P EST MOD 30 MIN: CPT | Performed by: OTHER

## 2021-04-15 PROCEDURE — 3008F BODY MASS INDEX DOCD: CPT | Performed by: OTHER

## 2021-04-15 RX ORDER — FLUOXETINE 20 MG/1
20 TABLET, FILM COATED ORAL DAILY
COMMUNITY

## 2021-04-15 NOTE — PROGRESS NOTES
[facility] with Liang Rodgersdavid  4/18/1975  Primary Care Provider:  Rinku Chávez MD    4/15/2021  Accompanied visit: family    ( ) No.      50year old yo patient being seen fo carbidopa-levodopa  MG Oral Tab, Take 1 tablet by mouth 3 (three) times daily. , Disp: 270 tablet, Rfl: 2  •  BACLOFEN 10 MG Oral Tab, TAKE 1 TABLET BY MOUTH EVERY DAY AT NIGHT, Disp: 90 tablet, Rfl: 2  •  SUMAtriptan Succinate 100 MG Oral Tab, One ta Time completed 10:02 AM    Decision making:  ( x ) labs reviewed/ordered - 1  (  ) new diagnosis: - 1  ( x) Images & studies independently reviewed -non F2F  (  ) Case/studies discussed with other caregivers - -non F2F  (  ) Telephone time with patiern or

## 2021-04-15 NOTE — PROGRESS NOTES
She follows up today after visiting with psychiatry and she has been diagnosed with psychosis and her medications have been adjusted.     Her neuropsychological testing did reveal minor cognitive impairment due to emotional status but they did not uncover m

## 2021-04-20 RX ORDER — GABAPENTIN 100 MG/1
CAPSULE ORAL
Qty: 90 CAPSULE | Refills: 1 | OUTPATIENT
Start: 2021-04-20

## 2021-04-29 ENCOUNTER — OFFICE VISIT (OUTPATIENT)
Dept: PAIN MANAGEMENT | Age: 46
End: 2021-04-29

## 2021-04-29 VITALS — RESPIRATION RATE: 16 BRPM | DIASTOLIC BLOOD PRESSURE: 76 MMHG | SYSTOLIC BLOOD PRESSURE: 118 MMHG | TEMPERATURE: 97.6 F

## 2021-04-29 DIAGNOSIS — M79.2 ATYPICAL NEURALGIA: ICD-10-CM

## 2021-04-29 DIAGNOSIS — M54.16 CHRONIC LUMBAR RADICULOPATHY: ICD-10-CM

## 2021-04-29 DIAGNOSIS — F44.4 FUNCTIONAL NEUROLOGICAL SYMPTOM DISORDER WITH ABNORMAL MOVEMENT: ICD-10-CM

## 2021-04-29 DIAGNOSIS — G43.009 MIGRAINE WITHOUT AURA AND WITHOUT STATUS MIGRAINOSUS, NOT INTRACTABLE: ICD-10-CM

## 2021-04-29 DIAGNOSIS — G43.409 HEMIPLEGIC MIGRAINE WITHOUT STATUS MIGRAINOSUS, NOT INTRACTABLE: Primary | ICD-10-CM

## 2021-04-29 DIAGNOSIS — S24.109S SPINAL CORD INJURY OF DORSAL REGION WITHOUT BONE INJURY, SEQUELA (CMD): ICD-10-CM

## 2021-04-29 DIAGNOSIS — M79.2 SYMPATHETICALLY MAINTAINED PAIN: ICD-10-CM

## 2021-04-29 PROCEDURE — 99214 OFFICE O/P EST MOD 30 MIN: CPT | Performed by: ANESTHESIOLOGY

## 2021-04-29 RX ORDER — GABAPENTIN 100 MG/1
100 CAPSULE ORAL 3 TIMES DAILY
Qty: 90 CAPSULE | Refills: 1 | Status: SHIPPED | OUTPATIENT
Start: 2021-05-10 | End: 2021-06-21 | Stop reason: SDUPTHER

## 2021-04-29 ASSESSMENT — PATIENT HEALTH QUESTIONNAIRE - PHQ9
CLINICAL INTERPRETATION OF PHQ9 SCORE: NO FURTHER SCREENING NEEDED
SUM OF ALL RESPONSES TO PHQ9 QUESTIONS 1 AND 2: 0
CLINICAL INTERPRETATION OF PHQ2 SCORE: NO FURTHER SCREENING NEEDED
SUM OF ALL RESPONSES TO PHQ9 QUESTIONS 1 AND 2: 0
2. FEELING DOWN, DEPRESSED OR HOPELESS: NOT AT ALL
1. LITTLE INTEREST OR PLEASURE IN DOING THINGS: NOT AT ALL

## 2021-04-29 ASSESSMENT — ENCOUNTER SYMPTOMS
ALLERGIC/IMMUNOLOGIC NEGATIVE: 1
CONSTITUTIONAL NEGATIVE: 1
RESPIRATORY NEGATIVE: 1
EYES NEGATIVE: 1
PSYCHIATRIC NEGATIVE: 1
ENDOCRINE NEGATIVE: 1
HEMATOLOGIC/LYMPHATIC NEGATIVE: 1
BACK PAIN: 1
NEUROLOGICAL NEGATIVE: 1
GASTROINTESTINAL NEGATIVE: 1

## 2021-05-13 DIAGNOSIS — G24.9 DYSTONIA: ICD-10-CM

## 2021-05-13 DIAGNOSIS — M62.838 MUSCLE SPASM: ICD-10-CM

## 2021-05-13 RX ORDER — BACLOFEN 10 MG/1
TABLET ORAL
Qty: 90 TABLET | Refills: 2 | Status: SHIPPED | OUTPATIENT
Start: 2021-05-13 | End: 2021-08-19

## 2021-05-13 NOTE — TELEPHONE ENCOUNTER
Medication: Carbidopa-Levodopa  mg  & Baclofen 10 mg    Date of last refill: 08/21/20 with 2 addt refills  [place]     Last office visit: 04/15/21  Due back to clinic per last office note: RTN in 3 months  Date nex

## 2021-06-21 ENCOUNTER — TELEPHONE (OUTPATIENT)
Dept: PAIN MANAGEMENT | Age: 46
End: 2021-06-21

## 2021-06-21 RX ORDER — GABAPENTIN 100 MG/1
100 CAPSULE ORAL 3 TIMES DAILY
Qty: 90 CAPSULE | Refills: 1 | Status: SHIPPED | OUTPATIENT
Start: 2021-06-21 | End: 2021-07-22 | Stop reason: SDUPTHER

## 2021-07-01 ENCOUNTER — TELEPHONE (OUTPATIENT)
Dept: NEUROLOGY | Facility: CLINIC | Age: 46
End: 2021-07-01

## 2021-07-01 NOTE — TELEPHONE ENCOUNTER
Patient calling, requesting forms to be completed by Dr. Zahraa Jones for work accommodation. She states that her work requires 8 hours of standing and advises that her medical issues make this difficult.  Requesting an accommodation that allows patient to sit

## 2021-07-12 ENCOUNTER — TELEPHONE (OUTPATIENT)
Dept: PAIN MANAGEMENT | Age: 46
End: 2021-07-12

## 2021-07-16 NOTE — TELEPHONE ENCOUNTER
LMTCB. Paperwork reviewed and appears to require very specific restrictions.  Need to discuss specifics of request.

## 2021-07-19 NOTE — TELEPHONE ENCOUNTER
Spoke with patient to seek specific answers to questions on FMLA papers. Prepared to patient answers and placed in RN bin for signature of Dr Sadie Love if approves.

## 2021-07-22 ENCOUNTER — OFFICE VISIT (OUTPATIENT)
Dept: PAIN MANAGEMENT | Age: 46
End: 2021-07-22

## 2021-07-22 VITALS — SYSTOLIC BLOOD PRESSURE: 118 MMHG | DIASTOLIC BLOOD PRESSURE: 78 MMHG

## 2021-07-22 DIAGNOSIS — M79.2 SYMPATHETICALLY MAINTAINED PAIN: ICD-10-CM

## 2021-07-22 DIAGNOSIS — G43.409 HEMIPLEGIC MIGRAINE WITHOUT STATUS MIGRAINOSUS, NOT INTRACTABLE: Primary | ICD-10-CM

## 2021-07-22 DIAGNOSIS — F44.4 FUNCTIONAL NEUROLOGICAL SYMPTOM DISORDER WITH ABNORMAL MOVEMENT: ICD-10-CM

## 2021-07-22 DIAGNOSIS — S24.109S SPINAL CORD INJURY OF DORSAL REGION WITHOUT BONE INJURY, SEQUELA (CMD): ICD-10-CM

## 2021-07-22 DIAGNOSIS — M54.16 CHRONIC LUMBAR RADICULOPATHY: ICD-10-CM

## 2021-07-22 DIAGNOSIS — M79.2 ATYPICAL NEURALGIA: ICD-10-CM

## 2021-07-22 PROCEDURE — 99214 OFFICE O/P EST MOD 30 MIN: CPT | Performed by: ANESTHESIOLOGY

## 2021-07-22 RX ORDER — PREDNISONE 10 MG/1
10 TABLET ORAL DAILY
Qty: 15 TABLET | Refills: 1 | Status: SHIPPED | OUTPATIENT
Start: 2021-07-22 | End: 2021-08-06

## 2021-07-22 RX ORDER — GABAPENTIN 100 MG/1
CAPSULE ORAL
Qty: 180 CAPSULE | Refills: 1 | Status: SHIPPED | OUTPATIENT
Start: 2021-07-22

## 2021-07-22 ASSESSMENT — PATIENT HEALTH QUESTIONNAIRE - PHQ9
SUM OF ALL RESPONSES TO PHQ9 QUESTIONS 1 AND 2: 0
SUM OF ALL RESPONSES TO PHQ9 QUESTIONS 1 AND 2: 0
CLINICAL INTERPRETATION OF PHQ2 SCORE: NO FURTHER SCREENING NEEDED
2. FEELING DOWN, DEPRESSED OR HOPELESS: NOT AT ALL
1. LITTLE INTEREST OR PLEASURE IN DOING THINGS: NOT AT ALL
CLINICAL INTERPRETATION OF PHQ9 SCORE: NO FURTHER SCREENING NEEDED

## 2021-07-22 ASSESSMENT — ENCOUNTER SYMPTOMS
EYES NEGATIVE: 1
ENDOCRINE NEGATIVE: 1
CONSTITUTIONAL NEGATIVE: 1
PSYCHIATRIC NEGATIVE: 1
BACK PAIN: 1
GASTROINTESTINAL NEGATIVE: 1
ALLERGIC/IMMUNOLOGIC NEGATIVE: 1
NEUROLOGICAL NEGATIVE: 1
RESPIRATORY NEGATIVE: 1
HEMATOLOGIC/LYMPHATIC NEGATIVE: 1

## 2021-07-22 NOTE — TELEPHONE ENCOUNTER
Paperwork signed by provider and faxed as requested to [phone] with receipt confirmation. Paperwork to scanning.

## 2021-08-19 ENCOUNTER — OFFICE VISIT (OUTPATIENT)
Dept: NEUROLOGY | Facility: CLINIC | Age: 46
End: 2021-08-19
Payer: COMMERCIAL

## 2021-08-19 VITALS
RESPIRATION RATE: 16 BRPM | HEIGHT: 64 IN | BODY MASS INDEX: 37.9 KG/M2 | WEIGHT: 222 LBS | HEART RATE: 82 BPM | SYSTOLIC BLOOD PRESSURE: 116 MMHG | DIASTOLIC BLOOD PRESSURE: 71 MMHG

## 2021-08-19 DIAGNOSIS — R41.89 COGNITIVE DEFICITS: ICD-10-CM

## 2021-08-19 DIAGNOSIS — F32.3 CURRENT SEVERE EPISODE OF MAJOR DEPRESSIVE DISORDER WITH PSYCHOTIC FEATURES WITHOUT PRIOR EPISODE (HCC): ICD-10-CM

## 2021-08-19 DIAGNOSIS — G43.009 MIGRAINE WITHOUT AURA AND WITHOUT STATUS MIGRAINOSUS, NOT INTRACTABLE: ICD-10-CM

## 2021-08-19 DIAGNOSIS — M62.838 MUSCLE SPASM: ICD-10-CM

## 2021-08-19 DIAGNOSIS — G24.9 DYSTONIA: Primary | ICD-10-CM

## 2021-08-19 DIAGNOSIS — M54.16 LUMBAR RADICULOPATHY: ICD-10-CM

## 2021-08-19 DIAGNOSIS — F44.4 FUNCTIONAL NEUROLOGICAL SYMPTOM DISORDER WITH ABNORMAL MOVEMENT: ICD-10-CM

## 2021-08-19 PROCEDURE — 3008F BODY MASS INDEX DOCD: CPT | Performed by: OTHER

## 2021-08-19 PROCEDURE — 99214 OFFICE O/P EST MOD 30 MIN: CPT | Performed by: OTHER

## 2021-08-19 PROCEDURE — 3078F DIAST BP <80 MM HG: CPT | Performed by: OTHER

## 2021-08-19 PROCEDURE — 3074F SYST BP LT 130 MM HG: CPT | Performed by: OTHER

## 2021-08-19 RX ORDER — DIVALPROEX SODIUM 500 MG/1
500 TABLET, EXTENDED RELEASE ORAL DAILY
COMMUNITY

## 2021-08-19 RX ORDER — BACLOFEN 10 MG/1
10 TABLET ORAL 2 TIMES DAILY
Qty: 180 TABLET | Refills: 3 | Status: SHIPPED | OUTPATIENT
Start: 2021-08-19

## 2021-08-19 RX ORDER — CLONAZEPAM 0.5 MG/1
0.5 TABLET ORAL 2 TIMES DAILY PRN
COMMUNITY

## 2021-08-19 RX ORDER — GABAPENTIN 300 MG/1
300 CAPSULE ORAL 3 TIMES DAILY
Qty: 90 CAPSULE | Refills: 5 | Status: SHIPPED | OUTPATIENT
Start: 2021-08-19

## 2021-08-19 RX ORDER — PALIPERIDONE 9 MG/1
9 TABLET, EXTENDED RELEASE ORAL EVERY MORNING
COMMUNITY

## 2021-08-19 NOTE — PROGRESS NOTES
[facility] with Tavera David  4/18/1975  Primary Care Provider:  Darline Arias MD    8/19/2021  Accompanied visit: Female friend    () No.      48year old yo patient being s Take 500 mg by mouth daily. , Disp: , Rfl:   •  gabapentin 300 MG Oral Cap, Take 1 capsule (300 mg total) by mouth 3 (three) times daily. , Disp: 90 capsule, Rfl: 5  •  baclofen 10 MG Oral Tab, Take 1 tablet (10 mg total) by mouth 2 (two) times daily. , Disp: Identified this visit:   Dystonia  (primary encounter diagnosis)  Muscle spasm  Migraine without aura and without status migrainosus, not intractable  Cognitive deficits  Lumbar radiculopathy  Current severe episode of major depressive disorder with psycho consultations  (  ) Fam meetings - patient not present --non F2F  (  ) Independent Historian obtained    Non Face to Face CPT code [id number] applies as documented above    PROCEDURE DONE     (   ) see notes      After visit, patient was escorted out and

## 2021-10-21 ENCOUNTER — TELEPHONE (OUTPATIENT)
Dept: NEUROLOGY | Facility: CLINIC | Age: 46
End: 2021-10-21

## 2021-10-21 NOTE — TELEPHONE ENCOUNTER
Per Epic review, new prescription was sent to pharmacy on 8/19/2021. Left message for patient that refills are available for increased dose. Called and verified with pharmacy. Will process the request for patient.

## 2021-10-21 NOTE — TELEPHONE ENCOUNTER
Patient needs refill on: Baclofen.      Advised that Dr. Lopez Bonds increased dose which is why she needs a refill earlier than normal.     Please refill, send to [facility].

## 2021-12-09 ENCOUNTER — TELEPHONE (OUTPATIENT)
Dept: PAIN MANAGEMENT | Age: 46
End: 2021-12-09

## 2021-12-09 ENCOUNTER — OFFICE VISIT (OUTPATIENT)
Dept: PAIN MANAGEMENT | Age: 46
End: 2021-12-09

## 2021-12-09 VITALS — DIASTOLIC BLOOD PRESSURE: 78 MMHG | SYSTOLIC BLOOD PRESSURE: 116 MMHG

## 2021-12-09 DIAGNOSIS — M54.16 CHRONIC LUMBAR RADICULOPATHY: ICD-10-CM

## 2021-12-09 DIAGNOSIS — G43.409 HEMIPLEGIC MIGRAINE WITHOUT STATUS MIGRAINOSUS, NOT INTRACTABLE: ICD-10-CM

## 2021-12-09 DIAGNOSIS — G43.109 MIGRAINE WITH AURA AND WITHOUT STATUS MIGRAINOSUS, NOT INTRACTABLE: ICD-10-CM

## 2021-12-09 DIAGNOSIS — M79.2 SYMPATHETICALLY MAINTAINED PAIN: ICD-10-CM

## 2021-12-09 DIAGNOSIS — S24.109S SPINAL CORD INJURY OF DORSAL REGION WITHOUT BONE INJURY, SEQUELA (CMD): ICD-10-CM

## 2021-12-09 DIAGNOSIS — M79.2 ATYPICAL NEURALGIA: Primary | ICD-10-CM

## 2021-12-09 PROCEDURE — 99214 OFFICE O/P EST MOD 30 MIN: CPT | Performed by: ANESTHESIOLOGY

## 2021-12-09 RX ORDER — HYDROCODONE BITARTRATE AND ACETAMINOPHEN 5; 325 MG/1; MG/1
1 TABLET ORAL DAILY PRN
Qty: 30 TABLET | Refills: 0 | Status: SHIPPED | OUTPATIENT
Start: 2022-01-08 | End: 2022-02-07

## 2021-12-09 RX ORDER — HYDROCODONE BITARTRATE AND ACETAMINOPHEN 5; 325 MG/1; MG/1
1 TABLET ORAL DAILY PRN
Qty: 30 TABLET | Refills: 0 | Status: SHIPPED | OUTPATIENT
Start: 2021-12-09 | End: 2021-12-09 | Stop reason: SDUPTHER

## 2021-12-09 ASSESSMENT — ENCOUNTER SYMPTOMS
HEMATOLOGIC/LYMPHATIC NEGATIVE: 1
ENDOCRINE NEGATIVE: 1
GASTROINTESTINAL NEGATIVE: 1
ALLERGIC/IMMUNOLOGIC NEGATIVE: 1
EYES NEGATIVE: 1
CONSTITUTIONAL NEGATIVE: 1
PSYCHIATRIC NEGATIVE: 1
RESPIRATORY NEGATIVE: 1

## 2021-12-09 ASSESSMENT — PATIENT HEALTH QUESTIONNAIRE - PHQ9
2. FEELING DOWN, DEPRESSED OR HOPELESS: NOT AT ALL
SUM OF ALL RESPONSES TO PHQ9 QUESTIONS 1 AND 2: 0
SUM OF ALL RESPONSES TO PHQ9 QUESTIONS 1 AND 2: 0
1. LITTLE INTEREST OR PLEASURE IN DOING THINGS: NOT AT ALL
CLINICAL INTERPRETATION OF PHQ2 SCORE: NO FURTHER SCREENING NEEDED

## 2021-12-16 ENCOUNTER — TELEPHONE (OUTPATIENT)
Dept: PAIN MANAGEMENT | Age: 46
End: 2021-12-16

## 2022-01-10 DIAGNOSIS — M54.16 LUMBAR RADICULOPATHY: ICD-10-CM

## 2022-01-10 RX ORDER — BROMOCRIPTINE MESYLATE 5 MG/1
CAPSULE ORAL
Qty: 90 CAPSULE | Refills: 5 | Status: SHIPPED | OUTPATIENT
Start: 2022-01-10 | End: 2022-01-27

## 2022-01-13 ENCOUNTER — TELEPHONE (OUTPATIENT)
Dept: NEUROLOGY | Facility: CLINIC | Age: 47
End: 2022-01-13

## 2022-01-13 NOTE — TELEPHONE ENCOUNTER
RN called patient and was informed pt is having  Increased tremors- all day in both arms and legs. RN will route to provider for recommendation.

## 2022-01-24 NOTE — TELEPHONE ENCOUNTER
Pt called again stating she has increased tremors and medication dosage questions to assist with the tremors. Call pt to advise.

## 2022-01-25 NOTE — TELEPHONE ENCOUNTER
Spoke with patient and she describes tremors which has to be examined to be better able to give advise,  She will call to make appointment    Dr Shubham Roper

## 2022-01-25 NOTE — TELEPHONE ENCOUNTER
Pt had made first available appt for 3/3/22. LMTCB had cancellation at 1pm for this Thursday, 1/27/22.

## 2022-01-27 ENCOUNTER — OFFICE VISIT (OUTPATIENT)
Dept: NEUROLOGY | Facility: CLINIC | Age: 47
End: 2022-01-27
Payer: COMMERCIAL

## 2022-01-27 VITALS
RESPIRATION RATE: 16 BRPM | DIASTOLIC BLOOD PRESSURE: 87 MMHG | HEIGHT: 64 IN | BODY MASS INDEX: 37.9 KG/M2 | HEART RATE: 95 BPM | WEIGHT: 222 LBS | SYSTOLIC BLOOD PRESSURE: 133 MMHG

## 2022-01-27 DIAGNOSIS — G24.9 DYSTONIA: Primary | ICD-10-CM

## 2022-01-27 DIAGNOSIS — G25.2 POSTURAL TREMOR: ICD-10-CM

## 2022-01-27 PROCEDURE — 3008F BODY MASS INDEX DOCD: CPT | Performed by: OTHER

## 2022-01-27 PROCEDURE — 3079F DIAST BP 80-89 MM HG: CPT | Performed by: OTHER

## 2022-01-27 PROCEDURE — 99213 OFFICE O/P EST LOW 20 MIN: CPT | Performed by: OTHER

## 2022-01-27 PROCEDURE — 3075F SYST BP GE 130 - 139MM HG: CPT | Performed by: OTHER

## 2022-01-27 RX ORDER — GABAPENTIN 600 MG/1
600 TABLET ORAL 2 TIMES DAILY
Qty: 180 TABLET | Refills: 3 | Status: SHIPPED | OUTPATIENT
Start: 2022-01-27

## 2022-01-27 NOTE — PROGRESS NOTES
Spouse [employer] called and informed RN patient called with an increase of her med but couldn't remember. Partner called to confirm. RN advised the increase is Gabapentin 600mg BID.  Isabel Goldstein verbalized understanding and did not have any further que

## 2022-01-27 NOTE — PROGRESS NOTES
[facility] with Liang Hernandez  4/18/1975  Primary Care Provider:  Denisa Reed MD    1/27/2022  Accompanied visit:      (x) No.      48year old yo patient being seen for:  T Oral Cap DR Particles, Take 2 capsules (120 mg total) by mouth daily. , Disp: 180 capsule, Rfl: 0  •  FLUoxetine HCl 20 MG Oral Tab, Take 20 mg by mouth daily. , Disp: , Rfl:   •  OLANZapine 2.5 MG Oral Tab, Take 2.5 mg by mouth nightly., Disp: , Rfl:   •  A treatment relevant to above. Includes explanation of tests as necessary. Return in about 6 months (around 7/27/2022).       Patient understands that if needed, based on condition and or test results, follow up will be readjusted      Isabel Jama MD

## 2022-03-09 ENCOUNTER — OFFICE VISIT (OUTPATIENT)
Dept: NEUROLOGY | Facility: CLINIC | Age: 47
End: 2022-03-09
Payer: COMMERCIAL

## 2022-03-09 VITALS
DIASTOLIC BLOOD PRESSURE: 77 MMHG | RESPIRATION RATE: 16 BRPM | HEIGHT: 64 IN | HEART RATE: 88 BPM | BODY MASS INDEX: 37.9 KG/M2 | WEIGHT: 222 LBS | SYSTOLIC BLOOD PRESSURE: 130 MMHG

## 2022-03-09 DIAGNOSIS — G25.2 POSTURAL TREMOR: Primary | ICD-10-CM

## 2022-03-09 DIAGNOSIS — G43.009 MIGRAINE WITHOUT AURA AND WITHOUT STATUS MIGRAINOSUS, NOT INTRACTABLE: ICD-10-CM

## 2022-03-09 DIAGNOSIS — G21.9 SECONDARY PARKINSONISM, UNSPECIFIED SECONDARY PARKINSONISM TYPE (HCC): ICD-10-CM

## 2022-03-09 PROCEDURE — 3075F SYST BP GE 130 - 139MM HG: CPT | Performed by: OTHER

## 2022-03-09 PROCEDURE — 99214 OFFICE O/P EST MOD 30 MIN: CPT | Performed by: OTHER

## 2022-03-09 PROCEDURE — 3008F BODY MASS INDEX DOCD: CPT | Performed by: OTHER

## 2022-03-09 PROCEDURE — 3078F DIAST BP <80 MM HG: CPT | Performed by: OTHER

## 2022-03-21 ENCOUNTER — TELEPHONE (OUTPATIENT)
Dept: NEUROLOGY | Facility: CLINIC | Age: 47
End: 2022-03-21

## 2022-03-21 NOTE — TELEPHONE ENCOUNTER
Informed patient of message below; Increase to 1.5 tablets carbidopa/levodopa QID @ 7am,1pm, 5pm, 9pm) and call in one week with status. Verbalized understanding.

## 2022-03-21 NOTE — TELEPHONE ENCOUNTER
S Tremors are worse, wake her up and cannot sleep. B  Carbidopa/Levodopa increased to QID on 3/9/2022. A  Increased dose to QID, will now increase to 1.5 tabs per dose per OV note. R  Routed to provider for advice.

## 2022-03-21 NOTE — TELEPHONE ENCOUNTER
Pt stated medication was increased and experiencing increased shaking. Call pt to discuss and advise.

## 2022-03-25 RX ORDER — BACLOFEN 10 MG/1
TABLET ORAL
Qty: 90 TABLET | Refills: 2 | Status: SHIPPED | OUTPATIENT
Start: 2022-03-25

## 2022-04-08 ENCOUNTER — TELEPHONE (OUTPATIENT)
Dept: NEUROLOGY | Facility: CLINIC | Age: 47
End: 2022-04-08

## 2022-04-08 DIAGNOSIS — G25.2 POSTURAL TREMOR: ICD-10-CM

## 2022-04-08 DIAGNOSIS — M62.838 MUSCLE SPASM: Primary | ICD-10-CM

## 2022-04-08 NOTE — TELEPHONE ENCOUNTER
Pt stated she had  her medications and the directions are different from last time.   Please call to advice

## 2022-04-08 NOTE — TELEPHONE ENCOUNTER
RN spoke to provider and confirmed the prescription is     CARBIDOPA-LEVODOPA  MG Oral Tab 1 1/2 tabs TID. RN called the patient and confirmed the prescription. RN confirmed with the patient she is taking the prescription TID. RN confirmed the pharmacy and will send over the prescription.

## 2022-06-09 ENCOUNTER — TELEPHONE (OUTPATIENT)
Dept: NEUROLOGY | Facility: CLINIC | Age: 47
End: 2022-06-09

## 2022-06-10 NOTE — TELEPHONE ENCOUNTER
Second attempt to contact patient, no message left. LM for patient to call back with more information.

## 2022-10-25 NOTE — TELEPHONE ENCOUNTER
Informed Ayala Grant a note can be given to be off work. Schedule appt f/u post ER. Future Appointments   Date Time Provider Sally Kari   1/30/2019  8:30 AM Preston Mccann MD [facility]     Refills were given.     Pt will also Detail Level: Detailed General Sunscreen Counseling: I recommended a broad spectrum sunscreen with a SPF of 30 or higher.  I explained that SPF 30 sunscreens block approximately 97 percent of the sun's harmful rays.  Sunscreens should be applied at least 15 minutes prior to expected sun exposure and then every 2 hours after that as long as sun exposure continues. If swimming or exercising sunscreen should be reapplied every 45 minutes to an hour after getting wet or sweating.  One ounce, or the equivalent of a shot glass full of sunscreen, is adequate to protect the skin not covered by a bathing suit. I also recommended a lip balm with a sunscreen as well. Sun protective clothing can be used in lieu of sunscreen but must be worn the entire time you are exposed to the sun's rays.

## 2022-11-19 DIAGNOSIS — M62.838 MUSCLE SPASM: ICD-10-CM

## 2022-11-19 DIAGNOSIS — G24.9 DYSTONIA: ICD-10-CM

## 2022-11-21 RX ORDER — BACLOFEN 10 MG/1
TABLET ORAL
Qty: 180 TABLET | Refills: 0 | Status: SHIPPED | OUTPATIENT
Start: 2022-11-21

## 2023-01-09 DIAGNOSIS — G24.9 DYSTONIA: ICD-10-CM

## 2023-01-09 DIAGNOSIS — M62.838 MUSCLE SPASM: ICD-10-CM

## 2023-01-09 RX ORDER — BACLOFEN 10 MG/1
TABLET ORAL
Qty: 180 TABLET | Refills: 0 | Status: SHIPPED | OUTPATIENT
Start: 2023-01-09

## 2023-03-31 NOTE — TELEPHONE ENCOUNTER
Clarification on dosage is patient still taking 5 mg or 2.5 mg on bromocriptine
Medication: Bromocriptine 5 mg     Date of last refill: 07/08/2019 with 3 addt refills       Last office visit: 3/27/2019  Due back to clinic per last office note: 3-4 months  Date next office visit scheduled:  11/27/2019 with Dr Margarette Ugarte note
Spoke to patient and she takes 5 mg bid
I have personally seen and examined this patient.    I have reviewed all pertinent clinical information and reviewed all relevant imaging and diagnostic studies personally.   I counseled the patient about diagnostic testing and treatment plan. All questions were answered.   I discussed recommendations with the primary team.

## 2024-06-04 ENCOUNTER — EXTERNAL RECORD (OUTPATIENT)
Dept: HEALTH INFORMATION MANAGEMENT | Facility: OTHER | Age: 49
End: 2024-06-04

## 2024-09-06 ENCOUNTER — TELEPHONE (OUTPATIENT)
Dept: NEUROLOGY | Age: 49
End: 2024-09-06

## 2024-09-12 ENCOUNTER — APPOINTMENT (OUTPATIENT)
Dept: NEUROLOGY | Age: 49
End: 2024-09-12

## 2024-09-12 VITALS
SYSTOLIC BLOOD PRESSURE: 116 MMHG | HEART RATE: 102 BPM | BODY MASS INDEX: 37.2 KG/M2 | HEIGHT: 66 IN | WEIGHT: 231.48 LBS | DIASTOLIC BLOOD PRESSURE: 78 MMHG

## 2024-09-12 DIAGNOSIS — G70.00 SERONEGATIVE MYASTHENIA GRAVIS  (CMD): Primary | ICD-10-CM

## 2024-09-12 RX ORDER — AMANTADINE HYDROCHLORIDE 100 MG/1
100 CAPSULE, GELATIN COATED ORAL 2 TIMES DAILY
COMMUNITY

## 2024-09-12 RX ORDER — LIDOCAINE/PRILOCAINE 2.5 %-2.5%
CREAM (GRAM) TOPICAL PRN
COMMUNITY

## 2024-09-12 RX ORDER — PALIPERIDONE 9 MG/1
9 TABLET, EXTENDED RELEASE ORAL EVERY MORNING
COMMUNITY

## 2024-09-12 RX ORDER — PROCHLORPERAZINE MALEATE 10 MG
10 TABLET ORAL EVERY 6 HOURS PRN
COMMUNITY

## 2024-09-12 RX ORDER — PYRIDOSTIGMINE BROMIDE 60 MG/1
120 TABLET ORAL 3 TIMES DAILY
Qty: 540 TABLET | Refills: 3 | Status: SHIPPED | OUTPATIENT
Start: 2024-09-12 | End: 2025-09-12

## 2024-09-12 RX ORDER — ONDANSETRON 8 MG/1
8 TABLET, ORALLY DISINTEGRATING ORAL EVERY 8 HOURS PRN
COMMUNITY

## 2024-09-12 RX ORDER — LEVOTHYROXINE SODIUM 50 UG/1
50 TABLET ORAL DAILY
COMMUNITY

## 2024-09-12 RX ORDER — MULTIVITAMIN,THER AND MINERALS
TABLET ORAL DAILY
COMMUNITY

## 2024-09-12 RX ORDER — ACETAMINOPHEN 500 MG
TABLET ORAL EVERY 6 HOURS PRN
COMMUNITY

## 2024-09-12 RX ORDER — OXYCODONE HYDROCHLORIDE 5 MG/1
5 TABLET ORAL EVERY 4 HOURS PRN
COMMUNITY

## 2024-09-12 RX ORDER — PREDNISONE 50 MG/1
50 TABLET ORAL DAILY
COMMUNITY

## 2024-09-12 RX ORDER — CLONAZEPAM 0.5 MG/1
0.5 TABLET ORAL 2 TIMES DAILY PRN
COMMUNITY

## 2024-09-19 ENCOUNTER — TELEPHONE (OUTPATIENT)
Dept: NEUROLOGY | Age: 49
End: 2024-09-19

## 2024-09-26 ENCOUNTER — V-VISIT (OUTPATIENT)
Dept: NEUROLOGY | Age: 49
End: 2024-09-26

## 2024-09-26 DIAGNOSIS — G70.00 SERONEGATIVE MYASTHENIA GRAVIS  (CMD): Primary | ICD-10-CM

## 2024-10-01 ENCOUNTER — TELEPHONE (OUTPATIENT)
Dept: NEUROLOGY | Age: 49
End: 2024-10-01

## 2024-10-04 RX ORDER — PREDNISONE 20 MG/1
TABLET ORAL
Qty: 156 TABLET | Refills: 0 | Status: SHIPPED | OUTPATIENT
Start: 2024-10-04 | End: 2024-12-29

## 2024-10-08 ENCOUNTER — TELEPHONE (OUTPATIENT)
Dept: NEUROLOGY | Age: 49
End: 2024-10-08

## 2024-10-09 RX ORDER — SULFAMETHOXAZOLE/TRIMETHOPRIM 800-160 MG
1 TABLET ORAL
Qty: 36 TABLET | Refills: 1 | Status: SHIPPED | OUTPATIENT
Start: 2024-10-09 | End: 2025-04-07

## 2024-12-02 ENCOUNTER — TELEPHONE (OUTPATIENT)
Dept: NEUROLOGY | Age: 49
End: 2024-12-02

## 2024-12-06 ENCOUNTER — TELEPHONE (OUTPATIENT)
Dept: NEUROLOGY | Age: 49
End: 2024-12-06

## 2024-12-12 ENCOUNTER — OFFICE VISIT (OUTPATIENT)
Dept: NEUROLOGY | Age: 49
End: 2024-12-12

## 2024-12-12 VITALS
BODY MASS INDEX: 39.97 KG/M2 | SYSTOLIC BLOOD PRESSURE: 139 MMHG | HEART RATE: 108 BPM | DIASTOLIC BLOOD PRESSURE: 91 MMHG | WEIGHT: 248.68 LBS | HEIGHT: 66 IN

## 2024-12-12 DIAGNOSIS — G62.0 CHEMOTHERAPY-INDUCED PERIPHERAL NEUROPATHY  (CMD): ICD-10-CM

## 2024-12-12 DIAGNOSIS — F44.4 FUNCTIONAL NEUROLOGICAL SYMPTOM DISORDER WITH ABNORMAL MOVEMENT: ICD-10-CM

## 2024-12-12 DIAGNOSIS — G70.00 SERONEGATIVE MYASTHENIA GRAVIS  (CMD): Primary | ICD-10-CM

## 2024-12-12 DIAGNOSIS — F44.4 FUNCTIONAL NEUROLOGICAL SYMPTOM DISORDER WITH WEAKNESS OR PARALYSIS: ICD-10-CM

## 2024-12-12 DIAGNOSIS — T45.1X5A CHEMOTHERAPY-INDUCED PERIPHERAL NEUROPATHY  (CMD): ICD-10-CM

## 2024-12-12 PROCEDURE — 99215 OFFICE O/P EST HI 40 MIN: CPT | Performed by: STUDENT IN AN ORGANIZED HEALTH CARE EDUCATION/TRAINING PROGRAM

## 2024-12-12 RX ORDER — GABAPENTIN 300 MG/1
300 CAPSULE ORAL 3 TIMES DAILY
COMMUNITY
End: 2024-12-12 | Stop reason: SDUPTHER

## 2024-12-12 RX ORDER — CARBIDOPA AND LEVODOPA 25; 100 MG/1; MG/1
1.5 TABLET ORAL 4 TIMES DAILY
Qty: 540 TABLET | Refills: 3 | Status: SHIPPED | OUTPATIENT
Start: 2024-12-12 | End: 2025-12-12

## 2024-12-12 RX ORDER — GABAPENTIN 300 MG/1
300 CAPSULE ORAL 3 TIMES DAILY
Qty: 270 CAPSULE | Refills: 3 | Status: SHIPPED | OUTPATIENT
Start: 2024-12-12 | End: 2025-12-12

## 2024-12-12 RX ORDER — GABAPENTIN 100 MG/1
200 CAPSULE ORAL 3 TIMES DAILY
Qty: 540 CAPSULE | Refills: 3 | Status: SHIPPED | OUTPATIENT
Start: 2024-12-12 | End: 2025-12-12

## 2024-12-12 RX ORDER — AMANTADINE HYDROCHLORIDE 100 MG/1
100 CAPSULE, GELATIN COATED ORAL 2 TIMES DAILY
Qty: 180 CAPSULE | Refills: 3 | Status: SHIPPED | OUTPATIENT
Start: 2024-12-12 | End: 2025-12-12

## 2024-12-12 RX ORDER — BACLOFEN 10 MG/1
10 TABLET ORAL 2 TIMES DAILY
Qty: 180 TABLET | Refills: 3 | Status: SHIPPED | OUTPATIENT
Start: 2024-12-12 | End: 2025-12-12

## 2024-12-30 RX ORDER — PREDNISONE 20 MG/1
TABLET ORAL
Qty: 156 TABLET | Refills: 0 | OUTPATIENT
Start: 2024-12-30 | End: 2025-03-26

## 2025-02-06 ENCOUNTER — APPOINTMENT (OUTPATIENT)
Dept: PAIN MANAGEMENT | Age: 50
End: 2025-02-06

## 2025-03-20 ENCOUNTER — TELEPHONE (OUTPATIENT)
Dept: NEUROLOGY | Age: 50
End: 2025-03-20

## 2025-03-27 ENCOUNTER — V-VISIT (OUTPATIENT)
Dept: NEUROLOGY | Age: 50
End: 2025-03-27

## 2025-03-27 DIAGNOSIS — T45.1X5A CHEMOTHERAPY-INDUCED PERIPHERAL NEUROPATHY  (CMD): ICD-10-CM

## 2025-03-27 DIAGNOSIS — F44.4 FUNCTIONAL NEUROLOGICAL SYMPTOM DISORDER WITH ABNORMAL MOVEMENT: ICD-10-CM

## 2025-03-27 DIAGNOSIS — G62.0 CHEMOTHERAPY-INDUCED PERIPHERAL NEUROPATHY  (CMD): ICD-10-CM

## 2025-03-27 DIAGNOSIS — G70.00 SERONEGATIVE MYASTHENIA GRAVIS  (CMD): Primary | ICD-10-CM

## 2025-03-27 DIAGNOSIS — F44.4 FUNCTIONAL NEUROLOGICAL SYMPTOM DISORDER WITH WEAKNESS OR PARALYSIS: ICD-10-CM

## 2025-03-27 PROCEDURE — 99214 OFFICE O/P EST MOD 30 MIN: CPT | Performed by: STUDENT IN AN ORGANIZED HEALTH CARE EDUCATION/TRAINING PROGRAM

## 2025-06-18 ENCOUNTER — TELEPHONE (OUTPATIENT)
Dept: NEUROLOGY | Age: 50
End: 2025-06-18

## 2025-06-20 ENCOUNTER — TELEPHONE (OUTPATIENT)
Dept: NEUROLOGY | Age: 50
End: 2025-06-20

## 2025-06-26 ENCOUNTER — V-VISIT (OUTPATIENT)
Dept: NEUROLOGY | Age: 50
End: 2025-06-26

## 2025-06-26 DIAGNOSIS — T45.1X5A CHEMOTHERAPY-INDUCED PERIPHERAL NEUROPATHY  (CMD): ICD-10-CM

## 2025-06-26 DIAGNOSIS — G70.00 SERONEGATIVE MYASTHENIA GRAVIS  (CMD): Primary | ICD-10-CM

## 2025-06-26 DIAGNOSIS — G62.0 CHEMOTHERAPY-INDUCED PERIPHERAL NEUROPATHY  (CMD): ICD-10-CM

## 2025-06-26 DIAGNOSIS — F44.4 FUNCTIONAL NEUROLOGICAL SYMPTOM DISORDER WITH ABNORMAL MOVEMENT: ICD-10-CM

## 2025-06-26 DIAGNOSIS — T45.AX5D ADVERSE EFFECT OF IMMUNE CHECKPOINT INHIBITOR, SUBSEQUENT ENCOUNTER: ICD-10-CM

## 2025-08-06 RX ORDER — PYRIDOSTIGMINE BROMIDE 60 MG/1
60 TABLET ORAL 3 TIMES DAILY
Qty: 270 TABLET | Refills: 3 | Status: SHIPPED | OUTPATIENT
Start: 2025-08-06 | End: 2026-08-06

## 2025-10-23 ENCOUNTER — APPOINTMENT (OUTPATIENT)
Dept: NEUROLOGY | Age: 50
End: 2025-10-23

## (undated) DIAGNOSIS — G25.2 POSTURAL TREMOR: ICD-10-CM

## (undated) DIAGNOSIS — G24.9 DYSTONIA: ICD-10-CM

## (undated) DIAGNOSIS — M62.838 MUSCLE SPASM: ICD-10-CM

## (undated) NOTE — LETTER
Date: 3/6/2019    Patient Name: Gino Ramon  : 1975       To Whom It May Concern:     This letter has been written at the patient's request. The above patient was seen at the [facility] for treatment of a medical condit

## (undated) NOTE — LETTER
Date: 1/23/2019    Patient Name: Wing Ponce          To Whom It May Concern: This letter has been written at the patient's request. The above patient was seen at the [facility] for treatment of a medical condition.     This patient s

## (undated) NOTE — LETTER
Date: 2/27/2019    Patient Name: Neno Gutierrez          To Whom It May Concern: This letter has been written at the patient's request. The above patient was seen at the [facility] for treatment of a medical condition.     This patient

## (undated) NOTE — LETTER
Date: 1/21/2019    Patient Name: Basia Guevara          To Whom it may concern: This letter has been written at the patient's request. The above patient was seen at the [facility] for treatment of a medical condition.     This patient s

## (undated) NOTE — LETTER
Date: 3/6/2019    Patient Name: Jannette Haas  : 1975       To Whom It May Concern:     This letter has been written at the patient's request. The above patient was seen at the [facility] for treatment of a medical conditio